# Patient Record
Sex: FEMALE | Race: WHITE | NOT HISPANIC OR LATINO | URBAN - METROPOLITAN AREA
[De-identification: names, ages, dates, MRNs, and addresses within clinical notes are randomized per-mention and may not be internally consistent; named-entity substitution may affect disease eponyms.]

---

## 2017-11-17 ENCOUNTER — IMPORTED ENCOUNTER (OUTPATIENT)
Dept: URBAN - METROPOLITAN AREA CLINIC 50 | Facility: CLINIC | Age: 62
End: 2017-11-17

## 2018-11-16 ENCOUNTER — IMPORTED ENCOUNTER (OUTPATIENT)
Dept: URBAN - METROPOLITAN AREA CLINIC 50 | Facility: CLINIC | Age: 63
End: 2018-11-16

## 2019-08-19 NOTE — PATIENT DISCUSSION
POSTERIOR CAPSULAR FIBROSIS, OS &gt; OD:  VISUALLY SIGNIFICANT. SCHEDULE YAG CAPSULOTOMY OS. PATIENT WILL SEE DR. Joce Elizondo FOR POST-OP.

## 2019-11-01 ENCOUNTER — IMPORTED ENCOUNTER (OUTPATIENT)
Dept: URBAN - METROPOLITAN AREA CLINIC 50 | Facility: CLINIC | Age: 64
End: 2019-11-01

## 2021-04-17 ASSESSMENT — VISUAL ACUITY
OS_BAT: 20/20-
OD_BAT: 20/25
OD_CC: J1+@ 16 IN
OS_CC: J1+
OS_CC: 20/20-1
OS_OTHER: 20/25. 20/30.
OD_OTHER: 20/20-. 20/30-.
OS_CC: J1+@ 16 IN
OD_OTHER: 20/50. 20/80.
OS_OTHER: 20/20-. 20/40.
OD_BAT: 20/20-
OD_CC: 20/20-1
OD_CC: 20/40-
OS_BAT: 20/50
OS_OTHER: 20/50. 20/80.
OD_OTHER: 20/25. 20/30.
OD_BAT: 20/50
OD_CC: J1+@ 16 IN
OD_CC: J1+
OS_CC: 20/60
OS_BAT: 20/25
OD_CC: 20/20-3
OS_CC: 20/20-1
OS_CC: J1+@ 16 IN

## 2021-04-17 ASSESSMENT — TONOMETRY
OD_IOP_MMHG: 17
OS_IOP_MMHG: 17
OS_IOP_MMHG: 17
OD_IOP_MMHG: 17
OD_IOP_MMHG: 17
OS_IOP_MMHG: 17

## 2022-05-25 NOTE — PATIENT DISCUSSION
Educated patient on findings and condition. Prescribe warm compresses and eyelid scrubs QD-BID OU. Recommend fish oil supplementation with PCPs approval. Advised to call/RTC if si/sx persist or worsen. Monitor.

## 2022-06-17 NOTE — PATIENT DISCUSSION
Moderate guttata noted, with mild edema. Use Carolyn ointment nightly or drops 2-3x daily as directed. Monitor.

## 2022-06-17 NOTE — PATIENT DISCUSSION
Ed. patient. 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 Refer to Dr. Vicky Avila for evaluation and possible treatment. monitor.

## 2022-07-12 NOTE — PATIENT DISCUSSION
Ed. patient. 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 Refer to Dr. Darin Rivas for evaluation and possible treatment. monitor.

## 2022-07-12 NOTE — PATIENT DISCUSSION
Discussed risks/benefits and alternatives to surgery.  Explained the pathophysiology of the diagnosis in detail with the patient today.  Patient expressed understanding and will follow up for surgery at her convenience.

## 2022-10-14 NOTE — PATIENT DISCUSSION
Moderate guttata noted, with mild edema. Use Carolyn ointment nightly or drops 2-3x daily as directed. Monitor. Hemostasis: Drysol

## 2022-10-25 NOTE — PATIENT DISCUSSION
Reassured patient that she is continuing to heal well following surgery.  She presents w/ mild mucoid discharge and inflammation at the area.  Recommend warm compresses daily and antibiotic daily for one week.  Follow up if symptoms persist/worsen.